# Patient Record
Sex: FEMALE | Race: WHITE | NOT HISPANIC OR LATINO | URBAN - METROPOLITAN AREA
[De-identification: names, ages, dates, MRNs, and addresses within clinical notes are randomized per-mention and may not be internally consistent; named-entity substitution may affect disease eponyms.]

---

## 2022-03-04 ENCOUNTER — EMERGENCY (EMERGENCY)
Facility: HOSPITAL | Age: 32
LOS: 1 days | Discharge: HOME | End: 2022-03-04
Admitting: EMERGENCY MEDICINE
Payer: COMMERCIAL

## 2022-03-04 VITALS
RESPIRATION RATE: 19 BRPM | WEIGHT: 259.93 LBS | DIASTOLIC BLOOD PRESSURE: 96 MMHG | HEART RATE: 76 BPM | HEIGHT: 71 IN | OXYGEN SATURATION: 100 % | TEMPERATURE: 98 F | SYSTOLIC BLOOD PRESSURE: 146 MMHG

## 2022-03-04 PROCEDURE — 99283 EMERGENCY DEPT VISIT LOW MDM: CPT

## 2022-03-04 NOTE — ED ADULT TRIAGE NOTE - OTHER COMPLAINTS
CC of above R eye head injury by accident x tonight. no LOC no vomiting. denies any blood thinners use.

## 2022-03-05 PROCEDURE — 99283 EMERGENCY DEPT VISIT LOW MDM: CPT

## 2022-03-05 RX ORDER — IBUPROFEN 200 MG
600 TABLET ORAL ONCE
Refills: 0 | Status: COMPLETED | OUTPATIENT
Start: 2022-03-05 | End: 2022-03-05

## 2022-03-05 RX ADMIN — Medication 600 MILLIGRAM(S): at 00:15

## 2022-03-05 NOTE — ED PROVIDER NOTE - ENMT, MLM
Airway patent, Nasal mucosa clear. Mouth with normal mucosa. Throat has no vesicles, no oropharyngeal exudates and uvula is midline.  no facial bone tenderness or deformities, localized induration and tenderness over the right forehead, no ecchymosis, no periorbital hematoma

## 2022-03-05 NOTE — ED PROVIDER NOTE - NSFOLLOWUPINSTRUCTIONS_ED_ALL_ED_FT
Facial or Scalp Contusion      A facial or scalp contusion is a deep bruise (contusion) on the face or head. Contusions are the result of an injury that has caused bleeding under the skin. The contusion may turn blue, purple, or yellow.    Minor injuries may cause a painless contusion, but more severe contusions may stay painful and swollen for a few weeks. Injuries to the face and head generally cause a lot of swelling, especially around the eyes.      What are the causes?    A facial or scalp contusion is caused by a blunt injury, fall, or trauma to the face or head area.      What are the signs or symptoms?    Symptoms of this condition include:  •Swelling of the injured area.      •Discoloration of the injured area.      •Tenderness, soreness, or pain in the injured area.        How is this diagnosed?    This condition is diagnosed based on your medical history and a physical exam. An X-ray exam, CT scan, or MRI may be needed to check for any additional injuries, such as broken bones (fractures).      How is this treated?    Often, the best treatment for a facial or scalp contusion is applying cold compresses to the injured area. Over-the-counter medicines may also be recommended for pain control.      Follow these instructions at home:      Managing pain, stiffness, and swelling    •If directed, put ice on the injured area. To do this:  •Put ice in a plastic bag.      •Place a towel between your skin and the bag.      •Leave the ice on for 20 minutes, 2–3 times a day.        •If possible, raise (elevate) your head above the level of your heart while you are lying down. This may help reduce swelling.      General instructions     •Take over-the-counter and prescription medicines only as told by your health care provider.      •Return to your normal activities as told by your health care provider. Ask your health care provider what activities are safe for you.      •Rest as told by your health care provider.      •Keep all follow-up visits as told by your health care provider. This is important.        Contact a health care provider if:    •You have trouble biting or chewing.      •Your pain or swelling gets worse.      •You have pain when you move your eyes.        Get help right away if:    •You have severe pain or a headache that is not relieved by medicine.      •You have unusual sleepiness, confusion, or personality changes.      •You vomit.      •You have a nosebleed that does not stop.      •You have double vision or blurred vision.      •You have a continuous clear fluid draining from your nose or ear.      •You have trouble walking or using your arms or legs.      •You have severe dizziness.        Summary    •A facial or scalp contusion is a deep bruise (contusion) on the face or head.      •Contusions are the result of an injury that caused bleeding under the skin.      •Minor injuries will give you a painless contusion, but more severe contusions may stay painful and swollen for a few weeks.      •Often, the best treatment for a facial or scalp contusion is applying cold compresses to the injured area.      This information is not intended to replace advice given to you by your health care provider. Make sure you discuss any questions you have with your health care provider.

## 2022-03-05 NOTE — ED PROVIDER NOTE - CLINICAL SUMMARY MEDICAL DECISION MAKING FREE TEXT BOX
31 yo female w/o any significant PMH in the ER after she accidentally walked into the metal gate. Pt has right forehead contusion and tenderness. no hematoma or ecchymosis, no pain with eye movement, and no tenderness or deformities to facial bones. pt ambulatory and has normal the rest of her exam. ICE packs, pain control, rest. d/c home comfortable, recommend to f/u with PMD in a few days. No clinical findings to do any imaging at this time. return precautions discussed.

## 2022-03-05 NOTE — ED ADULT NURSE NOTE - OBJECTIVE STATEMENT
pt c/o head injury to R upper eye/eyebrow from walking into poll   denies LOC, vision change, n/v, dizziness  ice pack applied to eye pt c/o head injury to R upper eye/eyebrow from walking into poll, sustained large bump R eyebrow  denies LOC, vision change, n/v, dizziness  ice pack applied to eye

## 2022-03-05 NOTE — ED ADULT NURSE NOTE - CHPI ED NUR SYMPTOMS NEG
no blurred vision/no change in level of consciousness/no confusion/no dizziness/no loss of consciousness/no nausea

## 2022-03-05 NOTE — ED PROVIDER NOTE - PATIENT PORTAL LINK FT
You can access the FollowMyHealth Patient Portal offered by [facility] by registering at the following website: [email or web address]. By joining [facility]’s FollowMyHealth portal, you will also be able to view your health information using other applications (apps) compatible with our system.

## 2022-03-05 NOTE — ED PROVIDER NOTE - OBJECTIVE STATEMENT
33 yo female accidentally walked into a metal gate and bumped her right forehead and right knee. Pt didn't fall and was able to ambulated after. Pt c/o pain and swelling to the area of impact above her right eyebrow. Denies vision changes, nausea, vomiting, nose bleed, neck pain, CP.

## 2022-03-08 DIAGNOSIS — S05.91XA UNSPECIFIED INJURY OF RIGHT EYE AND ORBIT, INITIAL ENCOUNTER: ICD-10-CM
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
## 2022-03-08 DIAGNOSIS — S09.90XA UNSPECIFIED INJURY OF HEAD, INITIAL ENCOUNTER: ICD-10-CM
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
## 2022-03-08 DIAGNOSIS — S00.83XA CONTUSION OF OTHER PART OF HEAD, INITIAL ENCOUNTER: ICD-10-CM

## 2023-11-14 NOTE — ED ADULT TRIAGE NOTE - PRO INTERPRETER NEED 2
Patient is scheduled for a colonoscopy with Dr TSANG on 12/22. Please send Nulytely prep to patient's selected pharmacy.    Thank you, GI Preadmit Surgery Scheduler  English

## 2024-01-29 NOTE — ED ADULT TRIAGE NOTE - HEIGHT IN CM
-- DO NOT REPLY / DO NOT REPLY ALL --  -- Message is from Engagement Center Operations (ECO) --    ONLY TO BE USED WITHIN A REFILL MEDICATION ENCOUNTER    Med Refill  Is the patient currently having any symptoms?: No/Non-Emergent symptoms    Name of medication requested: See pended med    Is this the first request for the medication in the last 48 hours?: Yes    Patient is requesting a medication refill - medication is on active medication list    Patient is currently OUT of the requested medication - sent as HIGH priority    PATIENT STATES THAT HER ORIGINAL PRESCRIPTION SAID TO TAKE 1-2 PILLS A DAY, BUT THE LAST REFILL STATED 1 PILL A DAY SO SHE IS NOW OUT OF MEDICATION.     Full name of the provider who ordered the medication: Dr Nicole Morgan    Clinic site name / Account # for provider: [facility]     [facility]: Pharmacy  [id number] [address] [facility] [place]    Patient confirmed the above pharmacy as correct?  Yes    Caller Information         Type Contact Phone/Fax    01/29/2024 07:58 AM CST Phone (Incoming) Sara Hudson (Self) [phone] (M)            Alternative phone number: NA    Can a detailed message be left?: Yes         [phone]
